# Patient Record
Sex: MALE | Race: WHITE | Employment: UNEMPLOYED | URBAN - METROPOLITAN AREA
[De-identification: names, ages, dates, MRNs, and addresses within clinical notes are randomized per-mention and may not be internally consistent; named-entity substitution may affect disease eponyms.]

---

## 2017-08-23 PROBLEM — N52.2 DRUG-INDUCED ERECTILE DYSFUNCTION: Status: ACTIVE | Noted: 2017-08-08

## 2019-10-03 PROBLEM — N41.1 CHRONIC PROSTATITIS: Status: ACTIVE | Noted: 2019-10-03

## 2019-10-03 PROBLEM — L60.2 ONYCHOGRYPHOSIS: Status: ACTIVE | Noted: 2019-10-03

## 2019-10-03 PROBLEM — M79.674 PAIN IN RIGHT TOE(S): Status: ACTIVE | Noted: 2019-10-03

## 2019-10-03 PROBLEM — K21.9 GASTROESOPHAGEAL REFLUX DISEASE: Status: ACTIVE | Noted: 2019-02-19

## 2019-10-03 PROBLEM — R97.20 ELEVATED PROSTATE SPECIFIC ANTIGEN (PSA): Status: ACTIVE | Noted: 2019-10-03

## 2019-10-03 PROBLEM — R31.0 GROSS HEMATURIA: Status: ACTIVE | Noted: 2019-10-03

## 2019-10-03 PROBLEM — N48.6 PEYRONIE'S DISEASE: Status: ACTIVE | Noted: 2019-10-03

## 2021-10-20 PROBLEM — I48.91 A-FIB (HCC): Status: ACTIVE | Noted: 2021-10-20

## 2021-10-20 PROBLEM — R06.02 SHORTNESS OF BREATH: Status: ACTIVE | Noted: 2021-10-20

## 2021-10-20 PROBLEM — D64.9 ANEMIA: Status: ACTIVE | Noted: 2021-06-29

## 2022-09-24 ENCOUNTER — APPOINTMENT (OUTPATIENT)
Dept: CT IMAGING | Age: 67
End: 2022-09-24
Attending: STUDENT IN AN ORGANIZED HEALTH CARE EDUCATION/TRAINING PROGRAM
Payer: MEDICARE

## 2022-09-24 ENCOUNTER — HOSPITAL ENCOUNTER (EMERGENCY)
Age: 67
Discharge: HOME | End: 2022-09-24
Attending: STUDENT IN AN ORGANIZED HEALTH CARE EDUCATION/TRAINING PROGRAM
Payer: MEDICARE

## 2022-09-24 ENCOUNTER — APPOINTMENT (OUTPATIENT)
Dept: GENERAL RADIOLOGY | Age: 67
End: 2022-09-24
Attending: STUDENT IN AN ORGANIZED HEALTH CARE EDUCATION/TRAINING PROGRAM
Payer: MEDICARE

## 2022-09-24 VITALS
RESPIRATION RATE: 18 BRPM | HEART RATE: 67 BPM | TEMPERATURE: 97.5 F | SYSTOLIC BLOOD PRESSURE: 154 MMHG | OXYGEN SATURATION: 98 % | DIASTOLIC BLOOD PRESSURE: 87 MMHG

## 2022-09-24 DIAGNOSIS — M25.512 ACUTE PAIN OF LEFT SHOULDER: ICD-10-CM

## 2022-09-24 DIAGNOSIS — M25.532 PAIN IN BOTH WRISTS: ICD-10-CM

## 2022-09-24 DIAGNOSIS — M25.531 PAIN IN BOTH WRISTS: ICD-10-CM
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
## 2022-09-24 PROCEDURE — 99284 EMERGENCY DEPT VISIT MOD MDM: CPT

## 2022-09-24 PROCEDURE — 73030 X-RAY EXAM OF SHOULDER: CPT

## 2022-09-24 PROCEDURE — 73100 X-RAY EXAM OF WRIST: CPT

## 2022-09-24 PROCEDURE — 72190 X-RAY EXAM OF PELVIS: CPT

## 2022-09-24 PROCEDURE — 74011250637 HC RX REV CODE- 250/637: Performed by: STUDENT IN AN ORGANIZED HEALTH CARE EDUCATION/TRAINING PROGRAM

## 2022-09-24 PROCEDURE — 72192 CT PELVIS W/O DYE: CPT

## 2022-09-24 PROCEDURE — 72220 X-RAY EXAM SACRUM TAILBONE: CPT

## 2022-09-24 RX ORDER — ACETAMINOPHEN 500 MG
1000 TABLET ORAL ONCE
Status: COMPLETED | OUTPATIENT
Start: 2022-09-24 | End: 2022-09-24

## 2022-09-24 RX ADMIN — ACETAMINOPHEN 1000 MG: 500 TABLET, FILM COATED ORAL at 17:55

## 2022-09-24 NOTE — DISCHARGE INSTRUCTIONS
Recommend taking 1000mg acetaminophen (tylenol) every 8 hours. Use diclofenac gel as prescribed. Apply ice to the affected areas for 30 minutes 2-3 times per day. Follow-up with orthopedic provided or an orthopedic in your area.

## 2022-09-24 NOTE — ED PROVIDER NOTES
Patient is a 68-year-old male with a history of hypertension, bladder cancer, Peyronie disease, prostate nodule who presents to ED complaining of fall which occurred prior to arrival.  Patient reports he was at his grandchild's birthday party bowling when he stepped over the line and slipped on the slick ground of the bowling naeem. Patient reports he fell backwards catching himself with his hands and landing on his tailbone. Patient complains of pain to his coccyx, bilateral wrists and left shoulder. He denies head injury. He also denies loss of consciousness, numbness, weakness, visual changes, back pain.   No meds prior to arrival.       Past Medical History:   Diagnosis Date    Bladder cancer ([facility] [address] 10/1/13    [id number] HG urothelial carcinoma of the bladder     Essential hypertension     Peyronie disease     Prostate nodule        Past Surgical History:   Procedure Laterality Date    HX HERNIA REPAIR      HX ORTHOPAEDIC      left knee    HX ORTHOPAEDIC      right toe (5th digit)    HX OTHER SURGICAL      Facial (Derma)    HX TONSILLECTOMY      HX UROLOGICAL  10/1/13    TURBT, Dr. Jose Rosales UROLOGICAL  3/4/14    Cysto w/Bladder bx, Dr. Francesca Smith, [facility]    HX UROLOGICAL  12/18/14    Cysto, Dr. Sophie Cassidy    HX UROLOGICAL  12/10/15    Cysto, Dr. Francesca Smith, [facility]         Family History:   Problem Relation Age of Onset    Heart Disease Mother     Heart Disease Maternal Grandfather     Heart Disease Maternal Grandmother        Social History     Socioeconomic History    Marital status:      Spouse name: Not on file    Number of children: Not on file    Years of education: Not on file    Highest education level: Not on file   Occupational History    Not on file   Tobacco Use    Smoking status: Never    Smokeless tobacco: Never   Substance and Sexual Activity    Alcohol use: Yes     Comment: occasionally    Drug use: No    Sexual activity: Yes     Partners: Female   Other Topics Concern    Not on file Social History Narrative    Not on file     Social Determinants of Health     Financial Resource Strain: Not on file   Food Insecurity: Not on file   Transportation Needs: Not on file   Physical Activity: Not on file   Stress: Not on file   Social Connections: Not on file   Intimate Partner Violence: Not on file   Housing Stability: Not on file         ALLERGIES: Codeine, Erythromycin, Garamycin [gentamicin], and Naproxen    Review of Systems   Constitutional:  Negative for chills and fever. Eyes:  Negative for visual disturbance. Musculoskeletal:  Negative for arthralgias, back pain, gait problem, joint swelling, myalgias, neck pain and neck stiffness. Skin:  Negative for wound. Allergic/Immunologic: Negative for immunocompromised state. Neurological:  Negative for weakness and numbness. Hematological:  Does not bruise/bleed easily. All other systems reviewed and are negative. Vitals:    09/24/22 1634   BP: (!) 154/87   Pulse: 67   Resp: 18   Temp: 97.5 °F (36.4 °C)   SpO2: 98%            Physical Exam  Vitals and nursing note reviewed. Constitutional:       Appearance: Normal appearance. He is well-developed. HENT:      Head: Normocephalic and atraumatic. Nose: Nose normal.      Mouth/Throat:      Mouth: Mucous membranes are moist.   Eyes:      General: Lids are normal.      Extraocular Movements: Extraocular movements intact. Conjunctiva/sclera: Conjunctivae normal.      Pupils: Pupils are equal, round, and reactive to light. Cardiovascular:      Rate and Rhythm: Normal rate. Pulses: Normal pulses. Heart sounds: S1 normal and S2 normal.   Pulmonary:      Effort: Pulmonary effort is normal. No accessory muscle usage. Abdominal:      General: Abdomen is flat. Palpations: Abdomen is soft. Musculoskeletal:         General: Normal range of motion. Cervical back: Normal range of motion and neck supple. Comments: Generalized tenderness to bilateral wrists. Full ROM of wrists. No obvious deformity noted. Left shoulder with generalized tenderness to palpation. Full ROM of left shoulder. No midline cervical, thoracic or lumbar tenderness. Left buttock tenderness to palpation. Ambulates without difficulty. NVI distally. Skin:     General: Skin is warm and dry. Capillary Refill: Capillary refill takes less than 2 seconds. Neurological:      General: No focal deficit present. Mental Status: He is alert and oriented to person, place, and time. Mental status is at baseline. Psychiatric:         Attention and Perception: Attention normal.         Mood and Affect: Mood and affect normal.         Speech: Speech normal.         Behavior: Behavior normal. Behavior is cooperative. Thought Content: Thought content normal.         Cognition and Memory: Cognition normal.         Judgment: Judgment normal.        MDM  Number of Diagnoses or Management Options  Acute pain of left shoulder  Fall, initial encounter  Pain in both wrists  Diagnosis management comments: Patient with fall that occurred while bowling. X-ray of right wrist shows mild widening of scapholunate interval suggesting underlying ligamentous derangement. Patient given thumb spica splint. X-ray of sacrum and coccyx shows possible nondisplaced S5 fracture. CT pelvis ordered. Recommend patient take Tylenol and follow-up with Ortho. Patient reports he plans to follow-up with Ortho in [place] where he lives. Return to ER warnings discussed in detail. All questions addressed and answered. Amount and/or Complexity of Data Reviewed  Tests in the radiology section of CPT®: reviewed  Discuss the patient with other providers: yes (Dr. Maite Alexander, ED attending )      ED Course as of 09/24/22 1839   Sat Sep 24, 2022   1830 XR WRIST RT AP/LAT: IMPRESSION  No acute fracture or dislocation. Mild widening of scapholunate  interval suggesting underlying ligamentous derangement.  [KG]   1834 XR WRIST LEFT: IMPRESSION  No acute bony abnormality. [KG]   1835 XR SHOULDER LT AP LAT MIN 2 V: IMPRESSION  No acute bony abnormality.  [KG]      ED Course User Index  [KG] Dona Jacobs       Procedures

## 2022-09-24 NOTE — ED TRIAGE NOTES
Patient arrives complaining of lower back pain, bilateral hand and arm pain and left shoulder pain after falling while bowling today.